# Patient Record
Sex: FEMALE
[De-identification: names, ages, dates, MRNs, and addresses within clinical notes are randomized per-mention and may not be internally consistent; named-entity substitution may affect disease eponyms.]

---

## 2019-02-03 ENCOUNTER — HOSPITAL ENCOUNTER (EMERGENCY)
Dept: HOSPITAL 80 - CED | Age: 34
Discharge: HOME | End: 2019-02-03
Payer: COMMERCIAL

## 2019-02-03 VITALS — SYSTOLIC BLOOD PRESSURE: 126 MMHG | DIASTOLIC BLOOD PRESSURE: 82 MMHG

## 2019-02-03 DIAGNOSIS — J01.90: Primary | ICD-10-CM

## 2019-02-03 DIAGNOSIS — R51: ICD-10-CM

## 2019-02-03 NOTE — EDPHY
H & P


Time Seen by Provider: 02/03/19 21:55


HPI/ROS: 





CHIEF COMPLAINT:  Fever, cold symptoms, facial pain





HISTORY OF PRESENT ILLNESS:  33-year-old female presents reporting that she has 

had 5 days of the nasal congestion, sinus pain, facial pain, headache, and 

fevers.  Slight sore throat which has improved.  No significant coughing.  No 

nausea or vomiting.  Patient has history of seasonal allergies and uses Flonase 

and Allegra on a daily basis.  She has also recently started on a allergy 

therapy to desensitized her to various tree pollens.  She states when she does 

this therapy, sometimes her sinus congestion symptoms are worsened.  Also 

reports some pain in her right ear.


She denies chest pain or shortness of breath, no palpitations, vomiting, 

diarrhea, urinary complaints, or lightheadedness.  She did get a flu 

vaccination this year.





REVIEW OF SYSTEMS: 


A comprehensive 10 system review of systems was reviewed and is otherwise 

negative aside from elements mentioned in the history of present illness and 

medical decision making.





PAST MEDICAL HISTORY:  Allergies





SOCIAL HISTORY:  Nonsmoker, no marijuana, no illicit drugs, recently moved here 

from [facility]


VITAL SIGNS:  see nurse's notes.


GENERAL:  Well-developed, well-nourished, in no acute distress.  Chatty, 

conversant, sounds congested.


HEENT:  Atraumatic Eyes:  PERRL, EOMI, no conjunctival injection.  Tenderness 

to percussion across the frontal sinuses and maxillary sinuses.  Ears:  Right 

TM is partially occluded, left TM is clear.  Nose:  No discharge.  Mouth:  

moist mucous membranes.  Pharynx:  Slight erythema, no exudates, no swelling, 

no abscess.  Uvula is midline.


NECK:  Supple, no adenopathy, no meningismus, no tenderness. Negative Kernig's 

and Brudzinski's.


LUNGS:  Clear to auscultation bilaterally, no wheezes, rhonchi or rales.


CARDIAC:  Regular rate and rhythm, no rubs, murmurs or gallops.


ABDOMEN:  Soft, nontender, bowel sounds normal.


BACK:  No CVA tenderness.


EXTREMITIES:  Normal, no edema, FROM.


NEURO:  Alert and oriented, grossly nonfocal.


SKIN:  Warm and dry, no rash.


PSYCHIATRIC:  Normal mentation, no agitation.


Constitutional: 


 Initial Vital Signs











Temperature (C)  36.7 C   02/03/19 22:01


 


Heart Rate  82   02/03/19 22:01


 


Respiratory Rate  16   02/03/19 22:01


 


Blood Pressure  126/82 H  02/03/19 22:01


 


O2 Sat (%)  99   02/03/19 22:01








 











O2 Delivery Mode               Room Air














Allergies/Adverse Reactions: 


 





No Known Allergies Allergy (Unverified 02/03/19 22:00)


 








Home Medications: 














 Medication  Instructions  Recorded


 


Amoxicillin Trihydrate [Amoxil] 500 mg PO TID 7 Days  cap 02/03/19


 


Fluconazole [Diflucan] 200 mg PO ONCE #1 tablet 02/03/19


 


Fluticasone Nasal [Flonase Nasal  02/03/19





Spray]  


 


P-EPHED HCL/FEXOFENADINE HCL  02/03/19





[ALLEGRA-D 12 HOUR TABLET]  














Medical Decision Making


ED Course/Re-evaluation: 





33-year-old female presenting with fever and significant facial pain and 

congestion.  She did receive a flu vaccination this year.  No symptoms 

suggestive of strep throat.  She does complain about pain in her right ear, 

partially occluded with cerumen.





Patient was placed on amoxicillin 500 mg three times daily x7 days.  She was 

also advised to use a decongestant.  Please see the discharge instructions.  

She requested a prescription for Diflucan as she typically will develop be 

significant yeast infection after having antibiotics.








Differential Diagnosis: 





Differential diagnosis of the patient's symptom complex was considered 

including but not limited to viral upper respiratory infection, sinusitis, 

otitis media, meningitis, viral pharyngitis, strep pharyngitis, influenza, and 

mononucleosis.





Departure





- Departure


Disposition: Home, Routine, Self-Care


Clinical Impression: 


Sinusitis


Qualifiers:


 Sinusitis location: unspecified location Chronicity: acute Recurrence: non-

recurrent Qualified Code(s): J01.90 - Acute sinusitis, unspecified





Headache


Qualifiers:


 Headache type: unspecified Headache chronicity pattern: acute headache 

Intractability: not intractable Qualified Code(s): R51 - Headache





Condition: Good


Instructions:  Sinusitis (ED)


Additional Instructions: 


You been given a prescription of amoxicillin to treat your sinusitis.  Please 

take this as directed. 





Please continue to use your Flonase nasal spray.  This is available over-the-

counter.  Use as directed.





Sinusitis will not clear significantly without the use of decongestants.  

Please obtain a over-the-counter decongestant such as Sudafed.  Drink plenty of 

fluid with this decongestant.  Decongestants may keep you awake at night.  





Please take Tylenol or ibuprofen for headache pain and facial pain.





Mainstay of therapy will be to drink plenty of fluids, control your symptoms 

with over-the-counter medications, and get plenty of rest.





Return to the emergency department or follow up with primary care physician if 

your symptoms are worsening despite the above treatment, if you develop 

shortness of breath, if you are unable to drink fluids secondary to throat pain 

or other issues, if you developed, vomiting, diarrhea, or other concerns.





You may use the Diflucan when you finished your amoxicillin.


Referrals: 


Patient,NotPresent [Primary Care Provider] - As per Instructions


Prescriptions: 


Amoxicillin Trihydrate [Amoxil] 500 mg PO TID 7 Days  cap


Fluconazole [Diflucan] 200 mg PO ONCE #1 tablet